# Patient Record
Sex: MALE | Race: WHITE | NOT HISPANIC OR LATINO | Employment: FULL TIME | URBAN - METROPOLITAN AREA
[De-identification: names, ages, dates, MRNs, and addresses within clinical notes are randomized per-mention and may not be internally consistent; named-entity substitution may affect disease eponyms.]

---

## 2023-07-06 ENCOUNTER — OFFICE VISIT (OUTPATIENT)
Dept: PRIMARY CARE | Facility: CLINIC | Age: 38
End: 2023-07-06
Payer: COMMERCIAL

## 2023-07-06 VITALS
RESPIRATION RATE: 14 BRPM | DIASTOLIC BLOOD PRESSURE: 78 MMHG | OXYGEN SATURATION: 97 % | BODY MASS INDEX: 33.21 KG/M2 | WEIGHT: 232 LBS | HEIGHT: 70 IN | TEMPERATURE: 97.6 F | SYSTOLIC BLOOD PRESSURE: 114 MMHG | HEART RATE: 92 BPM

## 2023-07-06 DIAGNOSIS — Z13.220 SCREENING FOR HYPERLIPIDEMIA: ICD-10-CM

## 2023-07-06 DIAGNOSIS — M25.562 CHRONIC PAIN OF BOTH KNEES: Primary | ICD-10-CM

## 2023-07-06 DIAGNOSIS — Z13.29 THYROID DISORDER SCREEN: ICD-10-CM

## 2023-07-06 DIAGNOSIS — G89.29 CHRONIC PAIN OF BOTH KNEES: Primary | ICD-10-CM

## 2023-07-06 DIAGNOSIS — E55.9 VITAMIN D DEFICIENCY: ICD-10-CM

## 2023-07-06 DIAGNOSIS — R79.89 ABNORMAL LFTS: ICD-10-CM

## 2023-07-06 DIAGNOSIS — M25.561 CHRONIC PAIN OF BOTH KNEES: Primary | ICD-10-CM

## 2023-07-06 DIAGNOSIS — E29.1 HYPOGONADISM MALE: ICD-10-CM

## 2023-07-06 PROBLEM — N20.0 RENAL CALCULUS, RIGHT: Status: ACTIVE | Noted: 2023-07-06

## 2023-07-06 PROBLEM — N21.0 CALCIUM STONE OF BLADDER: Status: ACTIVE | Noted: 2023-07-06

## 2023-07-06 PROCEDURE — 1036F TOBACCO NON-USER: CPT | Performed by: FAMILY MEDICINE

## 2023-07-06 PROCEDURE — 99213 OFFICE O/P EST LOW 20 MIN: CPT | Performed by: FAMILY MEDICINE

## 2023-07-06 RX ORDER — CHOLECALCIFEROL (VITAMIN D3) 125 MCG
1 CAPSULE ORAL DAILY
COMMUNITY
Start: 2021-03-15

## 2023-07-06 RX ORDER — DICLOFENAC SODIUM 75 MG/1
75 TABLET, DELAYED RELEASE ORAL 2 TIMES DAILY PRN
Qty: 60 TABLET | Refills: 1 | Status: SHIPPED | OUTPATIENT
Start: 2023-07-06 | End: 2023-09-04

## 2023-07-06 RX ORDER — IBUPROFEN 200 MG
400 TABLET ORAL 3 TIMES DAILY PRN
COMMUNITY
Start: 2021-03-02 | End: 2023-07-06 | Stop reason: ALTCHOICE

## 2023-07-06 ASSESSMENT — ENCOUNTER SYMPTOMS
SHORTNESS OF BREATH: 0
PALPITATIONS: 0
CHEST TIGHTNESS: 0
CHILLS: 0
ARTHRALGIAS: 0
ABDOMINAL PAIN: 0
JOINT SWELLING: 1
CONFUSION: 0
FEVER: 0

## 2023-07-06 NOTE — PROGRESS NOTES
"Subjective   Patient ID: Santos Almendarez is a 38 y.o. male who presents for Knee Pain (Worsening over the past 3 months).    Knee Pain      patient comes in today complaining of bilateral knee pain.  Awildaley pinpoint a definite trigger.  Also notices a crunching sound in the knees on flexion and extension.  Has morning stiffness in the knees.  Denies any other unusual joint aches pains or stiffness.  Symptoms have been going on over the past month or 2.  He has been working out with weights eating differently and is put some muscle mass on.  He notices on days he works out a lot with his legs such as doing squats that the following day the pain is exacerbated.  He denies any repetitive high-impact activities on the knees.    Review of Systems   Constitutional:  Negative for chills and fever.   HENT:  Negative for congestion and ear pain.    Eyes:  Negative for visual disturbance.   Respiratory:  Negative for chest tightness and shortness of breath.    Cardiovascular:  Negative for chest pain and palpitations.   Gastrointestinal:  Negative for abdominal pain.   Musculoskeletal:  Positive for joint swelling. Negative for arthralgias.        Bilateral knee pain   Skin:  Negative for pallor.   Psychiatric/Behavioral:  Negative for confusion.        Objective   /78   Pulse 92   Temp 36.4 °C (97.6 °F)   Resp 14   Ht 1.778 m (5' 10\")   Wt 105 kg (232 lb)   SpO2 97%   BMI 33.29 kg/m²     Physical Exam  Constitutional:       General: He is not in acute distress.     Appearance: Normal appearance.   Cardiovascular:      Rate and Rhythm: Normal rate and regular rhythm.      Heart sounds: Normal heart sounds.   Pulmonary:      Effort: Pulmonary effort is normal. No respiratory distress.      Breath sounds: Normal breath sounds.   Musculoskeletal:         General: Normal range of motion.      Comments: Bilateral knees flexion extension grossly intact.  No significant tenderness to palpation.   Neurological: "      Mental Status: He is alert.       X-rays of both knees stop over-the-counter ibuprofen and start Voltaren 75 mg twice daily  Check fasting lab work  Return to office 1 month to reassess case  Assessment/Plan   Problem List Items Addressed This Visit       Abnormal LFTs     Check a CMP         Relevant Orders    CBC    Comprehensive Metabolic Panel    Hypogonadism male     Check testosterone levels         Relevant Orders    Comprehensive Metabolic Panel    Testosterone,Free and Total    Vitamin D deficiency     Continue to monitor and supplement as needed         Relevant Orders    Comprehensive Metabolic Panel    Vitamin D, Total    Thyroid disorder screen     TSH with reflex         Relevant Orders    TSH with reflex to Free T4 if abnormal    Screening for hyperlipidemia     Fasting lipid         Relevant Orders    Lipid Panel    Chronic pain of both knees - Primary     Bilateral knee x-rays  Continue over-the-counter ibuprofen and start Voltaren 75 mg twice daily           Relevant Medications    diclofenac (Voltaren) 75 mg EC tablet    Other Relevant Orders    Uric Acid    XR knees anteroposterior standing bilateral    Follow Up In Primary Care - Established

## 2023-07-26 ENCOUNTER — APPOINTMENT (OUTPATIENT)
Dept: PRIMARY CARE | Facility: CLINIC | Age: 38
End: 2023-07-26
Payer: COMMERCIAL

## 2024-02-15 ENCOUNTER — OFFICE VISIT (OUTPATIENT)
Dept: ORTHOPEDIC SURGERY | Facility: HOSPITAL | Age: 39
End: 2024-02-15
Payer: COMMERCIAL

## 2024-02-15 ENCOUNTER — HOSPITAL ENCOUNTER (OUTPATIENT)
Dept: RADIOLOGY | Facility: HOSPITAL | Age: 39
Discharge: HOME | End: 2024-02-15
Payer: COMMERCIAL

## 2024-02-15 DIAGNOSIS — M25.511 RIGHT SHOULDER PAIN, UNSPECIFIED CHRONICITY: ICD-10-CM

## 2024-02-15 DIAGNOSIS — M19.011 OSTEOARTHRITIS OF RIGHT SHOULDER, UNSPECIFIED OSTEOARTHRITIS TYPE: Primary | ICD-10-CM

## 2024-02-15 PROCEDURE — 1036F TOBACCO NON-USER: CPT | Performed by: ORTHOPAEDIC SURGERY

## 2024-02-15 PROCEDURE — 99203 OFFICE O/P NEW LOW 30 MIN: CPT | Performed by: ORTHOPAEDIC SURGERY

## 2024-02-15 PROCEDURE — 20606 DRAIN/INJ JOINT/BURSA W/US: CPT | Performed by: ORTHOPAEDIC SURGERY

## 2024-02-15 PROCEDURE — 2500000005 HC RX 250 GENERAL PHARMACY W/O HCPCS: Performed by: ORTHOPAEDIC SURGERY

## 2024-02-15 PROCEDURE — 73030 X-RAY EXAM OF SHOULDER: CPT | Mod: RT

## 2024-02-15 PROCEDURE — 2500000004 HC RX 250 GENERAL PHARMACY W/ HCPCS (ALT 636 FOR OP/ED): Performed by: ORTHOPAEDIC SURGERY

## 2024-02-15 RX ORDER — METHYLPREDNISOLONE ACETATE 40 MG/ML
30 INJECTION, SUSPENSION INTRA-ARTICULAR; INTRALESIONAL; INTRAMUSCULAR; SOFT TISSUE
Status: COMPLETED | OUTPATIENT
Start: 2024-02-15 | End: 2024-02-15

## 2024-02-15 RX ORDER — LIDOCAINE HYDROCHLORIDE 10 MG/ML
0.5 INJECTION INFILTRATION; PERINEURAL
Status: COMPLETED | OUTPATIENT
Start: 2024-02-15 | End: 2024-02-15

## 2024-02-15 RX ADMIN — LIDOCAINE HYDROCHLORIDE 0.5 ML: 10 INJECTION, SOLUTION INFILTRATION; PERINEURAL at 13:17

## 2024-02-15 RX ADMIN — METHYLPREDNISOLONE ACETATE 30 MG: 40 INJECTION, SUSPENSION INTRA-ARTICULAR; INTRALESIONAL; INTRAMUSCULAR; INTRASYNOVIAL; SOFT TISSUE at 13:17

## 2024-02-15 ASSESSMENT — PAIN SCALES - GENERAL: PAINLEVEL_OUTOF10: 6

## 2024-02-15 ASSESSMENT — ENCOUNTER SYMPTOMS
TROUBLE SWALLOWING: 0
FATIGUE: 0
WHEEZING: 0
CHILLS: 0
SHORTNESS OF BREATH: 0
FEVER: 0
COLOR CHANGE: 0

## 2024-02-15 ASSESSMENT — PAIN - FUNCTIONAL ASSESSMENT: PAIN_FUNCTIONAL_ASSESSMENT: 0-10

## 2024-02-15 NOTE — PROGRESS NOTES
Reason for Appointment  Chief Complaint   Patient presents with    Right Shoulder - Pain     History of Present Illness  New patient is a 38 y.o. male here today for evaluation of his right shoulder.  He is very active, he enjoys weightlifting and has been having pain for the last few months with overhead lifting and activity.  Most of the pain is over the AC joint.  X-rays taken today are reviewed and are normal.  No numbness or tingling down the arm.    History reviewed. No pertinent past medical history.    Past Surgical History:   Procedure Laterality Date    OTHER SURGICAL HISTORY  04/13/2022    Tonsillectomy       Medication Documentation Review Audit       Reviewed by Maryann Emery MA (Medical Assistant) on 02/15/24 at 1059      Medication Order Taking? Sig Documenting Provider Last Dose Status   cholecalciferol (Vitamin D-3) 125 MCG (5000 UT) capsule [id number] Yes Take 1 capsule (125 mcg) by mouth once daily. Historical Provider, MD Taking Active                    No Known Allergies    Review of Systems   Constitutional:  Negative for chills, fatigue and fever.   HENT:  Negative for postnasal drip and trouble swallowing.    Respiratory:  Negative for shortness of breath and wheezing.    Cardiovascular:  Negative for chest pain and leg swelling.   Skin:  Negative for color change and pallor.     Exam   On exam patient is alert, awake, and in no acute distress.  Head is normocephalic, no JVD, no auditory wheezes.  He has good shoulder motion with some mild pain with active forward flexion over 160 degrees.  He has tenderness over the right AC joint, mild pain with cross body adduction.  Good cuff strength with resisted external rotation, minimally positive impingement signs today.  No biceps tenderness anteriorly.  Good elbow and wrist motion.  No triggering of the digits.  Good pulses and sensation in the upper extremity.    Assessment   Encounter Diagnosis   Name Primary?    Right shoulder pain,  unspecified chronicity    Right shoulder osteoarthritis    Plan   We discussed conservative treatment today with an injection to calm down his symptoms.  We sterilely injected under ultrasound guidance Depo-Medrol and lidocaine into the right shoulder AC joint.  Patient understands the small risk of infection and the signs to look for as well as flare reaction.  Hopefully this gives him good relief.  He can slowly return to activity as tolerated.  He can follow-up with us as needed.    Patient ID: Santos Almendarez is a 38 y.o. male.    M Inj/Asp: R acromioclavicular on 2/15/2024 1:17 PM  Indications: pain  Details: 25 G needle, ultrasound-guided  Medications: 0.5 mL lidocaine 10 mg/mL (1 %); 30 mg methylPREDNISolone acetate 40 mg/mL  Outcome: tolerated well, no immediate complications    After discussing the risks and benefits of the procedure, we proceeded with the injection.  Using ultrasound guidance we identified the right shoulder acromioclavicular joint, edges were obtained.  We then sterilely injected a mixture of 30 mg of Depo-Medrol and 0.5 cc of 1% lidocaine into the right shoulder AC joint.  Patient tolerated the procedure well without any adverse side effects.      Procedure, treatment alternatives, risks and benefits explained, specific risks discussed. Consent was given by the patient. Immediately prior to procedure a time out was called to verify the correct patient, procedure, equipment, support staff and site/side marked as required. Patient was prepped and draped in the usual sterile fashion.       Written by Cass Wang saw, evaluated, and treated the patient with the PA

## 2024-05-23 ENCOUNTER — OFFICE VISIT (OUTPATIENT)
Dept: ORTHOPEDIC SURGERY | Facility: HOSPITAL | Age: 39
End: 2024-05-23
Payer: COMMERCIAL

## 2024-05-23 DIAGNOSIS — M19.011 OSTEOARTHRITIS OF RIGHT AC (ACROMIOCLAVICULAR) JOINT: Primary | ICD-10-CM

## 2024-05-23 PROCEDURE — 1036F TOBACCO NON-USER: CPT | Performed by: ORTHOPAEDIC SURGERY

## 2024-05-23 PROCEDURE — 99213 OFFICE O/P EST LOW 20 MIN: CPT | Performed by: ORTHOPAEDIC SURGERY

## 2024-05-23 PROCEDURE — 2500000005 HC RX 250 GENERAL PHARMACY W/O HCPCS: Performed by: ORTHOPAEDIC SURGERY

## 2024-05-23 PROCEDURE — 2500000004 HC RX 250 GENERAL PHARMACY W/ HCPCS (ALT 636 FOR OP/ED): Performed by: ORTHOPAEDIC SURGERY

## 2024-05-23 PROCEDURE — 20606 DRAIN/INJ JOINT/BURSA W/US: CPT | Mod: RT | Performed by: ORTHOPAEDIC SURGERY

## 2024-05-23 RX ADMIN — LIDOCAINE HYDROCHLORIDE 1 ML: 10 INJECTION, SOLUTION INFILTRATION; PERINEURAL at 14:35

## 2024-05-23 RX ADMIN — METHYLPREDNISOLONE ACETATE 30 MG: 40 INJECTION, SUSPENSION INTRA-ARTICULAR; INTRALESIONAL; INTRAMUSCULAR; INTRASYNOVIAL; SOFT TISSUE at 14:35

## 2024-05-23 ASSESSMENT — PAIN - FUNCTIONAL ASSESSMENT: PAIN_FUNCTIONAL_ASSESSMENT: 0-10

## 2024-05-23 ASSESSMENT — PAIN SCALES - GENERAL: PAINLEVEL_OUTOF10: 7

## 2024-05-24 PROCEDURE — 2500000005 HC RX 250 GENERAL PHARMACY W/O HCPCS: Performed by: ORTHOPAEDIC SURGERY

## 2024-05-24 PROCEDURE — 20606 DRAIN/INJ JOINT/BURSA W/US: CPT | Mod: RT | Performed by: ORTHOPAEDIC SURGERY

## 2024-05-24 PROCEDURE — 2500000004 HC RX 250 GENERAL PHARMACY W/ HCPCS (ALT 636 FOR OP/ED): Performed by: ORTHOPAEDIC SURGERY

## 2024-05-24 RX ORDER — LIDOCAINE HYDROCHLORIDE 10 MG/ML
1 INJECTION INFILTRATION; PERINEURAL
Status: COMPLETED | OUTPATIENT
Start: 2024-05-23 | End: 2024-05-23

## 2024-05-24 RX ORDER — METHYLPREDNISOLONE ACETATE 40 MG/ML
30 INJECTION, SUSPENSION INTRA-ARTICULAR; INTRALESIONAL; INTRAMUSCULAR; SOFT TISSUE
Status: COMPLETED | OUTPATIENT
Start: 2024-05-23 | End: 2024-05-23

## 2024-05-24 ASSESSMENT — ENCOUNTER SYMPTOMS
COLOR CHANGE: 0
FEVER: 0
ARTHRALGIAS: 1
JOINT SWELLING: 0
TROUBLE SWALLOWING: 0
FATIGUE: 0
SHORTNESS OF BREATH: 0
CHILLS: 0
WHEEZING: 0

## 2024-05-24 NOTE — PROGRESS NOTES
Reason for Appointment  Recurrent R shoulder pain    History of Present Illness  Patient is a 39 y.o. male here today for follow-up evaluation of recurrent right shoulder pain over the AC joint area.  He had previous injection 3-1/2 months ago in the AC joint that did give him good relief.  He has recurrent pain over this area, worse with overhead lifting.  He is very active and enjoys weightlifting.  No other changes in his past medical history, allergies, or medications.    History reviewed. No pertinent past medical history.    Past Surgical History:   Procedure Laterality Date    OTHER SURGICAL HISTORY  04/13/2022    Tonsillectomy       Medication Documentation Review Audit       Reviewed by Cass Rosado PA-C (Physician Assistant) on 05/24/24 at 0729      Medication Order Taking? Sig Documenting Provider Last Dose Status   cholecalciferol (Vitamin D-3) 125 MCG (5000 UT) capsule [id number] Yes Take 1 capsule (125 mcg) by mouth once daily. Historical Provider, MD Taking Active                    No Known Allergies    Review of Systems   Constitutional:  Negative for chills, fatigue and fever.   HENT:  Negative for nosebleeds and trouble swallowing.    Respiratory:  Negative for shortness of breath and wheezing.    Cardiovascular:  Negative for chest pain and leg swelling.   Musculoskeletal:  Positive for arthralgias. Negative for joint swelling.   Skin:  Negative for color change and pallor.     Exam   On exam the right shoulder does not show any severe swelling.  He has good active forward flexion over 150 degrees.  He has tenderness over the right AC joint, pain with cross body adduction.  Deltoid is functional.  Good cuff strength with resisted external rotation and minimally positive impingement signs today.  Good pulses and sensation in the upper extremity.    Assessment   Encounter Diagnosis   Name Primary?    Osteoarthritis of right AC (acromioclavicular) joint Yes     Plan   He would like another  injection today.  We sterilely injected under ultrasound guidance Depo-Medrol and lidocaine into the right shoulder AC joint.  Patient understands the small risk of infection and the signs to look for as well as flare reaction.  Hopefully this gives him good relief.  He can follow-up with us as needed.    M Inj/Asp: R acromioclavicular on 5/23/2024 2:35 PM  Indications: pain  Details: 25 G needle, ultrasound-guided  Medications: 1 mL lidocaine 10 mg/mL (1 %); 30 mg methylPREDNISolone acetate 40 mg/mL  Outcome: tolerated well, no immediate complications    After discussing the risks and benefits of the procedure, we proceeded with the injection.  We sterilely prepped the area and using ultrasound guidance we identified the right shoulder acromioclavicular joint, images were obtained.  We then sterilely injected a mixture of 30 mg of Depo-Medrol and 0.5cc of 1% lidocaine into the area.  Patient tolerated the procedure well without any adverse side effects.  Procedure, treatment alternatives, risks and benefits explained, specific risks discussed. Consent was given by the patient. Immediately prior to procedure a time out was called to verify the correct patient, procedure, equipment, support staff and site/side marked as required. Patient was prepped and draped in the usual sterile fashion.       Written by Cass Wang saw, evaluated, and treated the patient with the PA

## 2025-07-14 ENCOUNTER — OFFICE VISIT (OUTPATIENT)
Dept: URGENT CARE | Age: 40
End: 2025-07-14
Payer: COMMERCIAL

## 2025-07-14 VITALS
DIASTOLIC BLOOD PRESSURE: 78 MMHG | RESPIRATION RATE: 17 BRPM | TEMPERATURE: 98.4 F | HEART RATE: 92 BPM | SYSTOLIC BLOOD PRESSURE: 119 MMHG | OXYGEN SATURATION: 97 %

## 2025-07-14 DIAGNOSIS — L03.116 CELLULITIS OF LEFT LOWER EXTREMITY: Primary | ICD-10-CM
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
## 2025-07-14 RX ORDER — CEPHALEXIN 500 MG/1
500 CAPSULE ORAL 2 TIMES DAILY
Qty: 14 CAPSULE | Refills: 0 | Status: SHIPPED | OUTPATIENT
Start: 2025-07-14 | End: 2025-07-21

## 2025-07-14 NOTE — PROGRESS NOTES
Subjective   Patient ID: Santos Almendarez is a 40 y.o. male. They present today with a chief complaint of Insect Bite.    History of Present Illness  Patient presents with erythema to the back of his left leg.  States on Saturday he was bit multiple times by bugs.  States it does itch at times.  No fever or chills.      Past Medical History  Allergies as of 07/14/2025    (No Known Allergies)       Prescriptions Prior to Admission[1]     Medical History[2]    Surgical History[3]     reports that he quit smoking about 13 years ago. His smoking use included cigarettes. He has never been exposed to tobacco smoke. He has never used smokeless tobacco. He reports that he does not currently use alcohol. He reports that he does not use drugs.    Review of Systems  Review of Systems     See HPI                          Objective    Vitals:    07/14/25 0957   BP: 119/78   Pulse: 92   Resp: 17   Temp: 36.9 °C (98.4 °F)   TempSrc: Oral   SpO2: 97%     No LMP for male patient.    Physical Exam  Erythema and edema to left, upper posterior leg.  Warm to the touch.  No discharge or bleeding noted.    Constitutional:       Appearance: Normal appearance.   Cardiovascular:      Rate and Rhythm: Normal rate and regular rhythm.      Pulses: Normal pulses.      Heart sounds: Normal heart sounds.   Pulmonary:      Effort: Pulmonary effort is normal.      Breath sounds: Normal breath sounds.     Procedures    Point of Care Test & Imaging Results from this visit  No results found for this visit on 07/14/25.   Imaging  No results found.    Cardiology, Vascular, and Other Imaging  No other imaging results found for the past 2 days      Diagnostic study results (if any) were reviewed by LANA Marquez.    Assessment/Plan   Allergies, medications, history, and pertinent labs/EKGs/Imaging reviewed by LANA Marquez.     Medical Decision Making  At time of discharge patient was clinically well-appearing and HDS for  outpatient management. The patient and/or family was educated regarding diagnosis, supportive care, OTC and Rx medications. The patient and/or family was given the opportunity to ask questions prior to discharge.  They verbalized understanding of my discussion of the plans for treatment, expected course, indications to return to  or seek further evaluation in ED, and the need for timely follow up as directed.   They were provided with a work/school excuse if requested.    Orders and Diagnoses  Diagnoses and all orders for this visit:  Cellulitis of left lower extremity  -     cephalexin (Keflex) 500 mg capsule; Take 1 capsule (500 mg) by mouth 2 times a day for 7 days.  Bug bite, initial encounter      Medical Admin Record      Patient disposition: Home    Electronically signed by LANA Marquez  10:23 AM           [1] (Not in a hospital admission)   [2] History reviewed. No pertinent past medical history.  [3]   Past Surgical History:  Procedure Laterality Date    OTHER SURGICAL HISTORY  04/13/2022    Tonsillectomy